# Patient Record
Sex: FEMALE | Employment: UNEMPLOYED | URBAN - METROPOLITAN AREA
[De-identification: names, ages, dates, MRNs, and addresses within clinical notes are randomized per-mention and may not be internally consistent; named-entity substitution may affect disease eponyms.]

---

## 2019-12-13 ENCOUNTER — OFFICE VISIT (OUTPATIENT)
Dept: HEMATOLOGY | Age: 44
End: 2019-12-13

## 2019-12-13 VITALS
WEIGHT: 234 LBS | BODY MASS INDEX: 38.99 KG/M2 | HEIGHT: 65 IN | HEART RATE: 76 BPM | OXYGEN SATURATION: 99 % | TEMPERATURE: 98.8 F | DIASTOLIC BLOOD PRESSURE: 71 MMHG | SYSTOLIC BLOOD PRESSURE: 117 MMHG

## 2019-12-13 DIAGNOSIS — R16.0 LIVER MASS: Primary | ICD-10-CM

## 2019-12-13 DIAGNOSIS — R16.0 LIVER MASS: ICD-10-CM

## 2019-12-13 PROBLEM — J45.909 ASTHMA: Status: ACTIVE | Noted: 2019-12-13

## 2019-12-13 NOTE — PROGRESS NOTES
[facility], Tia DEL RIO Vicie Amsler, MD Lee Carina, SARI Escalera, [facility]     [place], [facility]   JUSTIN Gardiner-LOPEZ Joe, [facility]       Faviola Fuller Lobo [address] 136    at [address], [address] [place] [address] 22.    [phone]    FAX: [address] [place] [address], [address]    [phone]    FAX: [phone]       Patient Care Team:  Jordy Alarcon MD as PCP - General (Family Practice)      Problem List  Date Reviewed: 12/13/2019          Codes Class Noted    Liver mass ICD-10-CM: R16.0  ICD-9-CM: 573.8  12/13/2019        Asthma ICD-10-CM: J45.909  ICD-9-CM: 493.90  12/13/2019                The clinicians listed above have asked me to see [facility] in consultation regarding a liver mass. All medical records sent by the referring physicians were reviewed including imaging studies     The patient is a 38 y.o. [facility] female without any history of previous liver disease. The patient underwent a CT scan for evaluation of  symptoms and frequent UTIs 11/2019. This demonstrated a single mass measuring 3.1 x 1.5 cm in the right lobe, segment 5. The patient has not taken oral CH. She has used a giselle in the past.  She now uses a copper IUD. The patient has the following symptoms which are thought to be due to the liver disease:  fatigue, pain in the right side over the liver,     The patient is not currently experiencing the following symptoms of liver disease:  nausea, vomiting,     The patient completes all daily activities without any functional limitations. ASSESSMENT AND PLAN:  Liver mass   This is most likely to be hemangioma.   It is described as being heterogenously enhancing but not filling in from the periphery  No delayed images were obtained or reported. The patient is asymptomatic     Laboratory studies and imaging suggest the patient has no evidence of liver disease. Have performed laboratory testing to monitor liver function and degree of liver injury. This included BMP, hepatic panel, CBC with platelet count, INR. Liver transaminases are normal.  ALP is normal.  Liver function is normal.  The platelet count is normal.      Will measure the following serologic cancer markers AFP, This was normal.    Serologic testing for causes of chronic liver disease were ordered. All were negative     Will perform imaging of the liver with dynamic MRI. Treatment of other medical problems in patients with chronic liver disease  There are no contraindications for the patient to take most medications that are necessary for treatment of other medical issues. Counseling for alcohol in patients with chronic liver disease  The patient was counseled regarding alcohol consumption and the effect of alcohol on chronic liver disease. The patient does not consume any significant amount of alcohol. Vaccinations   Vaccination for viral hepatitis B is recommended since the patient has no serologic evidence of previous exposure or vaccination with immunity. Vaccination for viral hepatitis A is not needed. The patient has serologic evidence of prior exposure or vaccination with immunity. Routine vaccinations against other bacterial and viral agents can be performed as indicated. Annual flu vaccination should be administered if indicated. ALLERGIES  No Known Allergies    MEDICATIONS  Current Outpatient Medications   Medication Sig    prenatal vit-iron fumarate-fa (PRENATAL S) 27-0.8 mg Tab tablet Take 1 Tab by mouth daily. No current facility-administered medications for this visit.         SYSTEM REVIEW NOT RELATED TO LIVER DISEASE OR REVIEWED ABOVE:  Constitution systems: Negative for fever, chills, weight gain, weight loss. Eyes: Negative for visual changes. ENT: Negative for sore throat, painful swallowing. Respiratory: Negative for cough, hemoptysis, SOB. Cardiology: Negative for chest pain, palpitations. GI:  Negative for constipation or diarrhea. : Negative for urinary frequency, dysuria, hematuria, nocturia. Skin: Negative for rash. Hematology: Negative for easy bruising, blood clots. Musculo-skelatal: Negative for back pain, muscle pain, weakness. Neurologic: Negative for headaches, dizziness, vertigo, memory problems not related to HE. Psychology: Negative for anxiety, depression. FAMILY HISTORY:  The father  of Aortic disection. The mother Has/had the following chronic disease(s): DM. There is no family history of liver disease. SOCIAL HISTORY:  The patient is . The patient has 3 children,   The patient has never used tobacco products. The patient has never consumed significant amounts of alcohol. The patient does not work outside the home. PHYSICAL EXAMINATION:  Visit Vitals  /71 (BP 1 Location: Left arm, BP Patient Position: Sitting)   Pulse 76   Temp 98.8 °F (37.1 °C) (Tympanic)   Ht 5' 5\" (1.651 m)   Wt 234 lb (106.1 kg)   SpO2 99%   BMI 38.94 kg/m²     General: No acute distress. Eyes: Sclera anicteric. ENT: No oral lesions. Thyroid normal.  Nodes: No adenopathy. Skin: No spider angiomata. No jaundice. No palmar erythema. Respiratory: Lungs clear to auscultation. Cardiovascular: Regular heart rate. No murmurs. No JVD. Abdomen: Soft non-tender. Liver size normal to percussion/palpation. Spleen not palpable. No obvious ascites. Extremities: No edema. No muscle wasting. No gross arthritic changes. Neurologic: Alert and oriented. Cranial nerves grossly intact. No asterixis.     LABORATORY STUDIES:  Liver [place] of [address] [place] [address] & Units 12/13/2019   WBC 3.4 - 10.8 x10E3/uL 7.9   ANC 1.4 - 7.0 x10E3/uL 4.2   HGB 11.1 - 15.9 g/dL 11.9    - 450 x10E3/uL 341   AST 0 - 40 IU/L 18   ALT 0 - 32 IU/L 20   Alk Phos 39 - 117 IU/L 84   Bili, Total 0.0 - 1.2 mg/dL 0.3   Bili, Direct 0.00 - 0.40 mg/dL 0.11   Albumin 3.5 - 5.5 g/dL 4.6   BUN 6 - 24 mg/dL 19   Creat 0.57 - 1.00 mg/dL 0.87   Na 134 - 144 mmol/L 136   K 3.5 - 5.2 mmol/L 4.5   Cl 96 - 106 mmol/L 99   CO2 20 - 29 mmol/L 20   Glucose 65 - 99 mg/dL 94     Cancer Screening Latest Ref Rng & Units 12/13/2019   AFP, Serum 0.0 - 8.0 ng/mL 1.6     SEROLOGIES:  Serologies Latest Ref Rng & Units 12/13/2019   Hep A Ab, Total Negative Positive (A)   Hep B Surface Ag Negative Negative   Hep B Core Ab, Total Negative Negative   Hep B Surface AB QL  Non Reactive   Hep C Ab 0.0 - 0.9 s/co ratio <0.1     LIVER HISTOLOGY:  Not available or performed    ENDOSCOPIC PROCEDURES:  Not available or performed    RADIOLOGY:  11/2019. CT scan abdomen with and without IV contrast.  Normal appearing liver. Liver mass with heterogenous measuring 3.1 x 1.5 cm right lobe, segment 5. OTHER TESTING:  Not available or performed    FOLLOW-UP:  All of the issues listed above in the Assessment and Plan were discussed with the patient. All questions were answered. The patient expressed a clear understanding of the above. [facility] 87 in 4 weeks for Fibroscan. This should be 1-2 weeks after the next imaging study.         MD [facility] of [address], [facility] 22.  [phone]  [address] [place] [address]

## 2019-12-13 NOTE — PROGRESS NOTES
Angelina Taylor is a 38 y.o. female  Chief Complaint   Patient presents with    New Patient     liver mass         Visit Vitals  /71 (BP 1 Location: Left arm, BP Patient Position: Sitting)   Pulse 76   Temp 98.8 °F (37.1 °C) (Tympanic)   Ht 5' 5\" (1.651 m)   Wt 234 lb (106.1 kg)   SpO2 99%   BMI 38.94 kg/m²     3 most recent PHQ Screens 12/13/2019   Little interest or pleasure in doing things Not at all   Feeling down, depressed, irritable, or hopeless Not at all   Total Score PHQ 2 0         Learning Assessment 12/13/2019   PRIMARY LEARNER Patient   HIGHEST LEVEL OF EDUCATION - PRIMARY LEARNER  > 4 YEARS OF COLLEGE   BARRIERS PRIMARY LEARNER NONE   CO-LEARNER CAREGIVER No   PRIMARY LANGUAGE ENGLISH   LEARNER PREFERENCE PRIMARY READING   ANSWERED BY patient   RELATIONSHIP SELF     Abuse Screening Questionnaire 12/13/2019   Do you ever feel afraid of your partner? N   Are you in a relationship with someone who physically or mentally threatens you? N   Is it safe for you to go home?  Artie Nuñez

## 2019-12-14 LAB
ALBUMIN SERPL-MCNC: 4.6 G/DL (ref 3.5–5.5)
ALP SERPL-CCNC: 84 IU/L (ref 39–117)
ALT SERPL-CCNC: 20 IU/L (ref 0–32)
AST SERPL-CCNC: 18 IU/L (ref 0–40)
BASOPHILS # BLD AUTO: 0.1 X10E3/UL (ref 0–0.2)
BASOPHILS NFR BLD AUTO: 1 %
BILIRUB DIRECT SERPL-MCNC: 0.11 MG/DL (ref 0–0.4)
BILIRUB SERPL-MCNC: 0.3 MG/DL (ref 0–1.2)
BUN SERPL-MCNC: 19 MG/DL (ref 6–24)
BUN/CREAT SERPL: 22 (ref 9–23)
CALCIUM SERPL-MCNC: 9.5 MG/DL (ref 8.7–10.2)
CHLORIDE SERPL-SCNC: 99 MMOL/L (ref 96–106)
CO2 SERPL-SCNC: 20 MMOL/L (ref 20–29)
COMMENT, 144067: NORMAL
CREAT SERPL-MCNC: 0.87 MG/DL (ref 0.57–1)
EOSINOPHIL # BLD AUTO: 0.1 X10E3/UL (ref 0–0.4)
EOSINOPHIL NFR BLD AUTO: 2 %
ERYTHROCYTE [DISTWIDTH] IN BLOOD BY AUTOMATED COUNT: 12.7 % (ref 12.3–15.4)
GLUCOSE SERPL-MCNC: 94 MG/DL (ref 65–99)
HAV AB SER QL IA: POSITIVE
HBV CORE AB SERPL QL IA: NEGATIVE
HBV SURFACE AB SER QL: NON REACTIVE
HBV SURFACE AG SERPL QL IA: NEGATIVE
HCT VFR BLD AUTO: 37.7 % (ref 34–46.6)
HCV AB S/CO SERPL IA: <0.1 S/CO RATIO (ref 0–0.9)
HGB BLD-MCNC: 11.9 G/DL (ref 11.1–15.9)
IMM GRANULOCYTES # BLD AUTO: 0 X10E3/UL (ref 0–0.1)
IMM GRANULOCYTES NFR BLD AUTO: 0 %
LYMPHOCYTES # BLD AUTO: 2.9 X10E3/UL (ref 0.7–3.1)
LYMPHOCYTES NFR BLD AUTO: 37 %
MCH RBC QN AUTO: 25.4 PG (ref 26.6–33)
MCHC RBC AUTO-ENTMCNC: 31.6 G/DL (ref 31.5–35.7)
MCV RBC AUTO: 81 FL (ref 79–97)
MONOCYTES # BLD AUTO: 0.7 X10E3/UL (ref 0.1–0.9)
MONOCYTES NFR BLD AUTO: 9 %
NEUTROPHILS # BLD AUTO: 4.2 X10E3/UL (ref 1.4–7)
NEUTROPHILS NFR BLD AUTO: 51 %
PLATELET # BLD AUTO: 341 X10E3/UL (ref 150–450)
POTASSIUM SERPL-SCNC: 4.5 MMOL/L (ref 3.5–5.2)
PROT SERPL-MCNC: 7.3 G/DL (ref 6–8.5)
RBC # BLD AUTO: 4.68 X10E6/UL (ref 3.77–5.28)
SODIUM SERPL-SCNC: 136 MMOL/L (ref 134–144)
WBC # BLD AUTO: 7.9 X10E3/UL (ref 3.4–10.8)

## 2019-12-16 LAB
AFP L3 MFR SERPL: NORMAL % (ref 0–9.9)
AFP SERPL-MCNC: 1.6 NG/ML (ref 0–8)

## 2019-12-26 ENCOUNTER — HOSPITAL ENCOUNTER (OUTPATIENT)
Dept: MRI IMAGING | Age: 44
Discharge: HOME | End: 2019-12-26
Attending: INTERNAL MEDICINE
Payer: COMMERCIAL

## 2019-12-26 VITALS — WEIGHT: 231 LBS | BODY MASS INDEX: 38.44 KG/M2

## 2019-12-26 DIAGNOSIS — R16.0 LIVER MASS: ICD-10-CM

## 2019-12-26 PROCEDURE — 74183 MRI ABD W/O CNTR FLWD CNTR: CPT

## 2019-12-26 PROCEDURE — A9585 GADOBUTROL INJECTION: HCPCS | Performed by: INTERNAL MEDICINE

## 2019-12-26 PROCEDURE — 74011250636 HC RX REV CODE- 250/636: Performed by: INTERNAL MEDICINE

## 2019-12-26 RX ADMIN — GADOBUTROL 10 ML: 604.72 INJECTION INTRAVENOUS at 09:29

## 2020-01-10 ENCOUNTER — OFFICE VISIT (OUTPATIENT)
Dept: HEMATOLOGY | Age: 45
End: 2020-01-10

## 2020-01-10 VITALS
OXYGEN SATURATION: 99 % | TEMPERATURE: 99.2 F | DIASTOLIC BLOOD PRESSURE: 74 MMHG | WEIGHT: 236 LBS | HEIGHT: 65 IN | BODY MASS INDEX: 39.32 KG/M2 | HEART RATE: 88 BPM | SYSTOLIC BLOOD PRESSURE: 108 MMHG

## 2020-01-10 DIAGNOSIS — D18.03 LIVER HEMANGIOMA: Primary | ICD-10-CM

## 2020-01-10 NOTE — PROGRESS NOTES
Yareli Reyes is a 38 y.o. female  Chief Complaint   Patient presents with    Follow-up         Visit Vitals  /74 (BP 1 Location: Left arm, BP Patient Position: Sitting)   Pulse 88   Temp 99.2 °F (37.3 °C) (Tympanic)   Ht 5' 5\" (1.651 m)   Wt 236 lb (107 kg)   SpO2 99%   BMI 39.27 kg/m²     3 most recent PHQ Screens 1/10/2020   Little interest or pleasure in doing things Not at all   Feeling down, depressed, irritable, or hopeless Not at all   Total Score PHQ 2 0     Learning Assessment 1/10/2020   PRIMARY LEARNER Patient   HIGHEST LEVEL OF EDUCATION - PRIMARY LEARNER  -   BARRIERS PRIMARY LEARNER NONE   CO-LEARNER CAREGIVER No   PRIMARY LANGUAGE ENGLISH   LEARNER PREFERENCE PRIMARY DEMONSTRATION   ANSWERED BY patient   RELATIONSHIP SELF     Abuse Screening Questionnaire 1/10/2020   Do you ever feel afraid of your partner? N   Are you in a relationship with someone who physically or mentally threatens you? N   Is it safe for you to go home? Y         1. Have you been to the ER, urgent care clinic since your last visit? Hospitalized since your last visit? No    2. Have you seen or consulted any other health care providers outside of the [address] [place] [address] since your last visit? Include any pap smears or colon screening.  No

## 2020-01-10 NOTE — PROGRESS NOTES
[facility], Rony DEL RIO, Mague Mckeon MD Lauretha Head, PARINA Chacon Officer, [facility]     [place], [facility]   Susy Yun, FNP-LOPEZ Siddiqi, [facility]       Faviola Fuller [facility] De Knight 136    at [address], [facility] Mark Durant  22.    [phone]    FAX: [address] [place] [address], [address]    [phone]    FAX: [phone]       Patient Care Team:  Juwan Fontaine MD as PCP - General (Family Practice)      Problem List  Date Reviewed: 12/21/2019          Codes Class Noted    Liver mass ICD-10-CM: R16.0  ICD-9-CM: 573.8  12/13/2019        Asthma ICD-10-CM: J45.909  ICD-9-CM: 493.90  12/13/2019              Frieda Gonzalez returns to the [address] for management of 2 liver masses. The active problem list, all pertinent past medical history, medications, radiologic findings and laboratory findings related to the liver disorder were reviewed with the patient. The patient is a 38 [facility]. [facility] female without any history of previous liver disease. The patient underwent a CT scan for evaluation of  symptoms and frequent UTIs 11/2019. This demonstrated a single mass measuring 3.1 x 1.5 cm in the right lobe, segment 5. The most recent imaging of the liver was MRI performed in 12/2019. Results suggest that the liver is normal.  Hemangioma in Right lobe 2.9 x 1.4 cm. Right adrenal adenoma 1.9 x 2.3 cm    The patient has not taken [place]. She has used a gsielle in the past.  She now uses a copper IUD.     The patient has the following symptoms which are thought to be due to the liver disease:  fatigue, pain in the right side over the liver,     The patient is not currently experiencing the following symptoms of liver disease:  nausea, vomiting,     The patient completes all daily activities without any functional limitations. ASSESSMENT AND PLAN:  Hemangioma  There is a liver mass which characteristics of hemangioma on MRI. These lesions are benign and only rarely increase in size over time. Hemangiomas rarely bleed  Large hemangiomas can cause RUQ fullness of intermittent pain. No further imaging of the hemangioma is needed. Liver transaminases are normal.  ALP is normal.  Liver function is normal.  The platelet count is normal.    AFP was normal.  Serologic testing for chronic was negative for HBV, HCV. Adrenal adenoma  An right sided adrenal adenoma was identified. This should be monitored by the PCP. Treatment of other medical problems in patients with chronic liver disease  There are no contraindications for the patient to take most medications that are necessary for treatment of other medical issues. Counseling for alcohol in patients with chronic liver disease  The patient was counseled regarding alcohol consumption and the effect of alcohol on chronic liver disease. The patient does not consume any significant amount of alcohol. Vaccinations   Vaccination for viral hepatitis B is recommended since the patient has no serologic evidence of previous exposure or vaccination with immunity. Vaccination for viral hepatitis A is not needed. The patient has serologic evidence of prior exposure or vaccination with immunity. Routine vaccinations against other bacterial and viral agents can be performed as indicated. Annual flu vaccination should be administered if indicated. ALLERGIES  No Known Allergies    MEDICATIONS  Current Outpatient Medications   Medication Sig    prenatal vit-iron fumarate-fa (PRENATAL S) 27-0.8 mg Tab tablet Take 1 Tab by mouth daily. No current facility-administered medications for this visit.         SYSTEM REVIEW NOT RELATED TO LIVER DISEASE OR REVIEWED ABOVE:  Constitution systems: Negative for fever, chills, weight gain, weight loss. Eyes: Negative for visual changes. ENT: Negative for sore throat, painful swallowing. Respiratory: Negative for cough, hemoptysis, SOB. Cardiology: Negative for chest pain, palpitations. GI:  Negative for constipation or diarrhea. : Negative for urinary frequency, dysuria, hematuria, nocturia. Skin: Negative for rash. Hematology: Negative for easy bruising, blood clots. Musculo-skelatal: Negative for back pain, muscle pain, weakness. Neurologic: Negative for headaches, dizziness, vertigo, memory problems not related to HE. Psychology: Negative for anxiety, depression. FAMILY HISTORY:  The father  of Aortic disection. The mother Has/had the following chronic disease(s): DM. There is no family history of liver disease. SOCIAL HISTORY:  The patient is . The patient has 3 children,   The patient has never used tobacco products. The patient has never consumed significant amounts of alcohol. The patient does not work outside the home. PHYSICAL EXAMINATION:  Visit Vitals  /74 (BP 1 Location: Left arm, BP Patient Position: Sitting)   Pulse 88   Temp 99.2 °F (37.3 °C) (Tympanic)   Ht 5' 5\" (1.651 m)   Wt 236 lb (107 kg)   SpO2 99%   BMI 39.27 kg/m²     General: No acute distress. Eyes: Sclera anicteric. ENT: No oral lesions. Thyroid normal.  Nodes: No adenopathy. Skin: No spider angiomata. No jaundice. No palmar erythema. Respiratory: Lungs clear to auscultation. Cardiovascular: Regular heart rate. No murmurs. No JVD. Abdomen: Soft non-tender. Liver size normal to percussion/palpation. Spleen not palpable. No obvious ascites. Extremities: No edema. No muscle wasting. No gross arthritic changes. Neurologic: Alert and oriented. Cranial nerves grossly intact. No asterixis.     LABORATORY STUDIES:  Liver [place] Latest Ref Rng & Units 12/13/2019   WBC 3.4 - 10.8 x10E3/uL 7.9   ANC 1.4 - 7.0 x10E3/uL 4.2   HGB 11.1 - 15.9 g/dL 11.9    - 450 x10E3/uL 341   AST 0 - 40 IU/L 18   ALT 0 - 32 IU/L 20   Alk Phos 39 - 117 IU/L 84   Bili, Total 0.0 - 1.2 mg/dL 0.3   Bili, Direct 0.00 - 0.40 mg/dL 0.11   Albumin 3.5 - 5.5 g/dL 4.6   BUN 6 - 24 mg/dL 19   Creat 0.57 - 1.00 mg/dL 0.87   Na 134 - 144 mmol/L 136   K 3.5 - 5.2 mmol/L 4.5   Cl 96 - 106 mmol/L 99   CO2 20 - 29 mmol/L 20   Glucose 65 - 99 mg/dL 94     Cancer Screening Latest Ref Rng & Units 12/13/2019   AFP, Serum 0.0 - 8.0 ng/mL 1.6     SEROLOGIES:  Serologies Latest Ref Rng & Units 12/13/2019   Hep A Ab, Total Negative Positive (A)   Hep B Surface Ag Negative Negative   Hep B Core Ab, Total Negative Negative   Hep B Surface AB QL  Non Reactive   Hep C Ab 0.0 - 0.9 s/co ratio <0.1     LIVER HISTOLOGY:  Not available or performed    ENDOSCOPIC PROCEDURES:  Not available or performed    RADIOLOGY:  11/2019. CT scan abdomen with and without IV contrast.  Normal appearing liver. Liver mass with heterogenous measuring 3.1 x 1.5 cm right lobe, segment 5.    12/2019. Dynamic MRI of liver. Normal appearing liver. Liver mass consistent with hemangioma in the   right lobe measuring 2.9 x 1.4 cm. Right sided adrenal adenoma measuring 1.9 x 2.3 cm    OTHER TESTING:  Not available or performed    FOLLOW-UP:  All of the issues listed above in the Assessment and Plan were discussed with the patient. All questions were answered. The patient expressed a clear understanding of the above. No follow-up at [facility] is needed. I would be glad to see the patient back for follow-up at any time in the future if the clinical situation changes.       Buster Morrison MD  [facility] of [address], [address] [place] [address], [facility] 22.  [phone]  [facility]

## 2020-01-10 NOTE — Clinical Note
1/26/20 Patient: Adi Rodriguez YOB: 1975 Date of Visit: 1/10/2020 Catia Magdaleno MD 
[address] [address] VIA Facsimile: [phone] Dear Catia Magdaleno MD, Thank you for referring Ms. Adi Rodriguez [facility]. My notes for this consultation are attached. If you have questions, please do not hesitate to call me. I look forward to following your patient along with you. Sincerely, Jhon Devi MD

## 2022-01-19 ENCOUNTER — HOSPITAL ENCOUNTER (EMERGENCY)
Age: 47
Discharge: HOME | End: 2022-01-19
Attending: STUDENT IN AN ORGANIZED HEALTH CARE EDUCATION/TRAINING PROGRAM
Payer: COMMERCIAL

## 2022-01-19 ENCOUNTER — APPOINTMENT (OUTPATIENT)
Dept: CT IMAGING | Age: 47
End: 2022-01-19
Attending: STUDENT IN AN ORGANIZED HEALTH CARE EDUCATION/TRAINING PROGRAM
Payer: COMMERCIAL

## 2022-01-19 VITALS
BODY MASS INDEX: 40.98 KG/M2 | DIASTOLIC BLOOD PRESSURE: 80 MMHG | RESPIRATION RATE: 16 BRPM | SYSTOLIC BLOOD PRESSURE: 112 MMHG | HEIGHT: 65 IN | OXYGEN SATURATION: 99 % | WEIGHT: 246 LBS | HEART RATE: 92 BPM | TEMPERATURE: 97.2 F

## 2022-01-19 DIAGNOSIS — R10.9 ACUTE RIGHT FLANK PAIN: Primary | ICD-10-CM

## 2022-01-19 DIAGNOSIS — K76.0 HEPATIC STEATOSIS: ICD-10-CM

## 2022-01-19 PROCEDURE — 74176 CT ABD & PELVIS W/O CONTRAST: CPT

## 2022-01-19 PROCEDURE — 99282 EMERGENCY DEPT VISIT SF MDM: CPT

## 2022-01-20 NOTE — ED TRIAGE NOTES
Patient arrives reporting nausea, bilateral flank pain, and fever that began yesterday. Per pt she is covid vaccinated including booster. Pt [facility], reports \"chronic uti for two years\".  Negative at home rapid covid test yesterday, pending pcr from [facility] today

## 2022-01-20 NOTE — ED NOTES
PT verbalizes readiness for discharge. Discussed with pt follow up with primary care for further evaluation of symptoms and to seek medical attention as needed.

## 2022-01-20 NOTE — ED PROVIDER NOTES
Flank Pain   This is a new problem. The current episode started 2 days ago. The problem has not changed since onset. The problem occurs daily. Patient reports not work related injury. The pain is associated with no known injury. The pain is present in the lower back and right side. The quality of the pain is described as aching. The pain does not radiate. The pain is mild. The symptoms are aggravated by certain positions. The pain is the same all the time. Associated symptoms include a fever (100.3 F yesterday). Pertinent negatives include no headaches, no abdominal pain and no dysuria. Associated symptoms comments: Fast HR yesterday  . Treatments tried: finished Macrobid yesterday. Risk factors: chronic UTIs, follows with uro-gyn.  appendectomy       Past Medical History:   Diagnosis Date    Low blood pressure        Past Surgical History:   Procedure Laterality Date    HX APPENDECTOMY      HX OTHER SURGICAL      ovarian cystectomy    HX OTHER SURGICAL      appendectomy         Family History:   Problem Relation Age of Onset    Hypertension Father        Social History     Socioeconomic History    Marital status:      Spouse name: Not on file    Number of children: Not on file    Years of education: Not on file    Highest education level: Not on file   Occupational History    Not on file   Tobacco Use    Smoking status: Never Smoker    Smokeless tobacco: Never Used   Substance and Sexual Activity    Alcohol use: No    Drug use: No    Sexual activity: Yes     Partners: Male     Birth control/protection: None   Other Topics Concern    Not on file   Social History Narrative    Not on file     Social Determinants of Health     Financial Resource Strain:     Difficulty of Paying Living Expenses: Not on file   Food Insecurity:     Worried About Running Out of Food in the Last Year: Not on file    Richelle of Food in the Last Year: Not on file   Transportation Needs:     Lack of Transportation (Medical): Not on file    Lack of Transportation (Non-Medical): Not on file   Physical Activity:     Days of Exercise per Week: Not on file    Minutes of Exercise per Session: Not on file   Stress:     Feeling of Stress : Not on file   Social Connections:     Frequency of Communication with Friends and Family: Not on file    Frequency of Social Gatherings with Friends and Family: Not on file    Attends Catholic Services: Not on file    Active Member of [address] [place] [address] [employer] or Organizations: Not on file    Attends Club or Organization Meetings: Not on file    Marital Status: Not on file   Intimate Partner Violence:     Fear of Current or Ex-Partner: Not on file    Emotionally Abused: Not on file    Physically Abused: Not on file    Sexually Abused: Not on file   Housing Stability:     Unable to Pay for Housing in the Last Year: Not on file    Number of [place] in the Last Year: Not on file    Unstable Housing in the Last Year: Not on file         ALLERGIES: Patient has no known allergies. Review of Systems   Constitutional: Positive for fever (100.3 F yesterday). Gastrointestinal: Negative for abdominal pain. Genitourinary: Positive for flank pain and hematuria. Negative for dysuria. Neurological: Negative for headaches. All other systems reviewed and are negative. Vitals:    01/19/22 2042   BP: 112/80   Pulse: 92   Resp: 16   Temp: 97.2 °F (36.2 °C)   SpO2: 99%   Weight: 111.6 kg (246 lb)   Height: 5' 5\" (1.651 m)            Physical Exam  Vitals and nursing note reviewed. Constitutional:       General: She is not in acute distress. Appearance: She is well-developed. She is obese. HENT:      Head: Normocephalic and atraumatic. Eyes:      Conjunctiva/sclera: Conjunctivae normal.   Cardiovascular:      Rate and Rhythm: Normal rate and regular rhythm. Heart sounds: Normal heart sounds. Pulmonary:      Effort: Pulmonary effort is normal. No respiratory distress.    Abdominal: General: Abdomen is flat. There is no distension. Musculoskeletal:      Cervical back: Normal range of motion and neck supple. Skin:     General: Skin is warm and dry. Neurological:      Mental Status: She is alert and oriented to person, place, and time. Motor: No abnormal muscle tone. [facility]     Assessment and plan: This is a 67-year-old female with chronic UTI who [facility] due to right flank pain and concern for possible kidney stone as she had some microscopic hematuria on her urinalysis. The [facility] at [facility] earlier today was unremarkable, with normal white count and unremarkable chemistry. UA did not look consistent with infection but did have 3-5 RBCs. Given her new pain in the right flank area, will obtain a CT to rule out kidney stone. She has follow up with UroGYN tomorrow.

## 2022-03-19 PROBLEM — R16.0 LIVER MASS: Status: ACTIVE | Noted: 2019-12-13

## 2022-03-20 PROBLEM — J45.909 ASTHMA: Status: ACTIVE | Noted: 2019-12-13

## 2023-02-27 ENCOUNTER — HOSPITAL ENCOUNTER (EMERGENCY)
Age: 48
Discharge: HOME | End: 2023-02-27
Attending: EMERGENCY MEDICINE
Payer: COMMERCIAL

## 2023-02-27 VITALS
TEMPERATURE: 98.6 F | HEART RATE: 86 BPM | WEIGHT: 240 LBS | OXYGEN SATURATION: 96 % | HEIGHT: 65 IN | BODY MASS INDEX: 39.99 KG/M2 | SYSTOLIC BLOOD PRESSURE: 106 MMHG | DIASTOLIC BLOOD PRESSURE: 69 MMHG | RESPIRATION RATE: 20 BRPM

## 2023-02-27 DIAGNOSIS — R65.10 SEVERE SYSTEMIC INFLAMMATORY RESPONSE SYNDROME (SIRS) (HCC): ICD-10-CM

## 2023-02-27 DIAGNOSIS — N30.90 CYSTITIS: Primary | ICD-10-CM

## 2023-02-27 LAB
ANION GAP SERPL CALC-SCNC: 12 MMOL/L (ref 5–15)
APPEARANCE UR: ABNORMAL
BACTERIA URNS QL MICRO: ABNORMAL /HPF
BASOPHILS # BLD: 0 K/UL (ref 0–0.1)
BASOPHILS NFR BLD: 0 % (ref 0–1)
BILIRUB UR QL: NEGATIVE
BUN SERPL-MCNC: 19 MG/DL (ref 6–20)
BUN/CREAT SERPL: 17 (ref 12–20)
CALCIUM SERPL-MCNC: 9.8 MG/DL (ref 8.5–10.1)
CHLORIDE SERPL-SCNC: 99 MMOL/L (ref 97–108)
CO2 SERPL-SCNC: 25 MMOL/L (ref 21–32)
COLOR UR: ABNORMAL
CREAT SERPL-MCNC: 1.1 MG/DL (ref 0.55–1.02)
DIFFERENTIAL METHOD BLD: ABNORMAL
EOSINOPHIL # BLD: 0.2 K/UL (ref 0–0.4)
EOSINOPHIL NFR BLD: 2 % (ref 0–7)
EPITH CASTS URNS QL MICRO: ABNORMAL /LPF
ERYTHROCYTE [DISTWIDTH] IN BLOOD BY AUTOMATED COUNT: 13.1 % (ref 11.5–14.5)
GLUCOSE SERPL-MCNC: 109 MG/DL (ref 65–100)
GLUCOSE UR STRIP.AUTO-MCNC: 250 MG/DL
HCT VFR BLD AUTO: 39.6 % (ref 35–47)
HGB BLD-MCNC: 12.5 G/DL (ref 11.5–16)
HGB UR QL STRIP: ABNORMAL
IMM GRANULOCYTES # BLD AUTO: 0 K/UL (ref 0–0.04)
IMM GRANULOCYTES NFR BLD AUTO: 0 % (ref 0–0.5)
KETONES UR QL STRIP.AUTO: 15 MG/DL
LACTATE SERPL-SCNC: 2 MMOL/L (ref 0.4–2)
LEUKOCYTE ESTERASE UR QL STRIP.AUTO: ABNORMAL
LYMPHOCYTES # BLD: 0.7 K/UL (ref 0.8–3.5)
LYMPHOCYTES NFR BLD: 8 % (ref 12–49)
MCH RBC QN AUTO: 25.1 PG (ref 26–34)
MCHC RBC AUTO-ENTMCNC: 31.6 G/DL (ref 30–36.5)
MCV RBC AUTO: 79.4 FL (ref 80–99)
MONOCYTES # BLD: 0.4 K/UL (ref 0–1)
MONOCYTES NFR BLD: 5 % (ref 5–13)
MUCOUS THREADS URNS QL MICRO: ABNORMAL /LPF
NEUTS SEG # BLD: 7.1 K/UL (ref 1.8–8)
NEUTS SEG NFR BLD: 85 % (ref 32–75)
NITRITE UR QL STRIP.AUTO: POSITIVE
NRBC # BLD: 0 K/UL (ref 0–0.01)
NRBC BLD-RTO: 0 PER 100 WBC
PH UR STRIP: 6.5 (ref 5–8)
PLATELET # BLD AUTO: 314 K/UL (ref 150–400)
PMV BLD AUTO: 11.2 FL (ref 8.9–12.9)
POTASSIUM SERPL-SCNC: 3.9 MMOL/L (ref 3.5–5.1)
PROT UR STRIP-MCNC: >300 MG/DL
RBC # BLD AUTO: 4.99 M/UL (ref 3.8–5.2)
RBC #/AREA URNS HPF: ABNORMAL /HPF (ref 0–5)
RBC MORPH BLD: ABNORMAL
SODIUM SERPL-SCNC: 136 MMOL/L (ref 136–145)
SP GR UR REFRACTOMETRY: 1.01 (ref 1–1.03)
UROBILINOGEN UR QL STRIP.AUTO: >8 EU/DL (ref 0.2–1)
WBC # BLD AUTO: 8.4 K/UL (ref 3.6–11)
WBC URNS QL MICRO: ABNORMAL /HPF (ref 0–4)

## 2023-02-27 PROCEDURE — 36415 COLL VENOUS BLD VENIPUNCTURE: CPT

## 2023-02-27 PROCEDURE — 96374 THER/PROPH/DIAG INJ IV PUSH: CPT

## 2023-02-27 PROCEDURE — 74011000250 HC RX REV CODE- 250: Performed by: EMERGENCY MEDICINE

## 2023-02-27 PROCEDURE — 87086 URINE CULTURE/COLONY COUNT: CPT

## 2023-02-27 PROCEDURE — 99284 EMERGENCY DEPT VISIT MOD MDM: CPT

## 2023-02-27 PROCEDURE — 87040 BLOOD CULTURE FOR BACTERIA: CPT

## 2023-02-27 PROCEDURE — 85025 COMPLETE CBC W/AUTO DIFF WBC: CPT

## 2023-02-27 PROCEDURE — 81001 URINALYSIS AUTO W/SCOPE: CPT

## 2023-02-27 PROCEDURE — 96361 HYDRATE IV INFUSION ADD-ON: CPT

## 2023-02-27 PROCEDURE — 83605 ASSAY OF LACTIC ACID: CPT

## 2023-02-27 PROCEDURE — 74011250636 HC RX REV CODE- 250/636: Performed by: EMERGENCY MEDICINE

## 2023-02-27 PROCEDURE — 80048 BASIC METABOLIC PNL TOTAL CA: CPT

## 2023-02-27 PROCEDURE — 96375 TX/PRO/DX INJ NEW DRUG ADDON: CPT

## 2023-02-27 RX ORDER — METFORMIN HYDROCHLORIDE 500 MG/1
500 TABLET ORAL 2 TIMES DAILY WITH MEALS
COMMUNITY

## 2023-02-27 RX ORDER — NITROFURANTOIN (MACROCRYSTALS) 100 MG/1
CAPSULE ORAL
COMMUNITY
Start: 2023-02-26

## 2023-02-27 RX ORDER — ONDANSETRON 2 MG/ML
INJECTION INTRAMUSCULAR; INTRAVENOUS
Status: DISCONTINUED
Start: 2023-02-27 | End: 2023-02-28 | Stop reason: HOSPADM

## 2023-02-27 RX ORDER — SODIUM CHLORIDE 9 MG/ML
1000 INJECTION, SOLUTION INTRAVENOUS ONCE
Status: COMPLETED | OUTPATIENT
Start: 2023-02-27 | End: 2023-02-27

## 2023-02-27 RX ORDER — LEVOFLOXACIN 750 MG/1
750 TABLET ORAL DAILY
Qty: 10 TABLET | Refills: 0 | Status: SHIPPED | OUTPATIENT
Start: 2023-02-27 | End: 2023-03-09

## 2023-02-27 RX ORDER — ONDANSETRON 2 MG/ML
4 INJECTION INTRAMUSCULAR; INTRAVENOUS
Status: COMPLETED | OUTPATIENT
Start: 2023-02-27 | End: 2023-02-27

## 2023-02-27 RX ORDER — PHENAZOPYRIDINE HYDROCHLORIDE 200 MG/1
200 TABLET, FILM COATED ORAL 3 TIMES DAILY
Qty: 6 TABLET | Refills: 0 | Status: SHIPPED | OUTPATIENT
Start: 2023-02-27 | End: 2023-03-01

## 2023-02-27 RX ORDER — ONDANSETRON 4 MG/1
4 TABLET, FILM COATED ORAL
Qty: 20 TABLET | Refills: 0 | Status: SHIPPED | OUTPATIENT
Start: 2023-02-27

## 2023-02-27 RX ORDER — FLUCONAZOLE 100 MG/1
TABLET ORAL
COMMUNITY
Start: 2023-02-26

## 2023-02-27 RX ADMIN — SODIUM CHLORIDE 2 G: 9 INJECTION INTRAMUSCULAR; INTRAVENOUS; SUBCUTANEOUS at 21:21

## 2023-02-27 RX ADMIN — ONDANSETRON HYDROCHLORIDE 4 MG: 2 SOLUTION INTRAMUSCULAR; INTRAVENOUS at 21:28

## 2023-02-27 RX ADMIN — SODIUM CHLORIDE 1000 ML: 9 INJECTION, SOLUTION INTRAVENOUS at 21:21

## 2023-02-27 NOTE — Clinical Note
[facility]  [address]  [address]  [phone]    Work/School Note    Date: 2/27/2023    To Whom It May concern:    Neil Marino was seen and treated today in the emergency room by the following provider(s):  Attending Provider: Oliver Whiteside MD.      Neil Marino is excused from work/school on 02/27/23 and 02/28/23. She is medically clear to return to work/school on 3/1/2023.        Sincerely,          Isma Deshpande MD

## 2023-02-28 NOTE — ED PROVIDER NOTES
History of Present Illness:  Pt c/o UTI x 1 day, on meds, but today has a fever. Pt has hx of complicated UTIs. Also has nausea now    Past Medical History:   Diagnosis Date    Low blood pressure     UTI (urinary tract infection)        Past Surgical History:   Procedure Laterality Date    HX APPENDECTOMY      HX OTHER SURGICAL      ovarian cystectomy    HX OTHER SURGICAL      appendectomy         Family History:   Problem Relation Age of Onset    Hypertension Father        Social History     Socioeconomic History    Marital status:      Spouse name: Not on file    Number of children: Not on file    Years of education: Not on file    Highest education level: Not on file   Occupational History    Not on file   Tobacco Use    Smoking status: Never    Smokeless tobacco: Never   Substance and Sexual Activity    Alcohol use: No    Drug use: No    Sexual activity: Yes     Partners: Male     Birth control/protection: None   Other Topics Concern    Not on file   Social History Narrative    Not on file     Social Determinants of Health     Financial Resource Strain: Not on file   Food Insecurity: Not on file   Transportation Needs: Not on file   Physical Activity: Not on file   Stress: Not on file   Social Connections: Not on file   Intimate Partner Violence: Not on file   Housing Stability: Not on file         ALLERGIES: Patient has no known allergies. Review of Systems   Constitutional:  Positive for fever. Gastrointestinal:  Positive for abdominal pain and nausea. Genitourinary:  Positive for dysuria, frequency and urgency. Negative for difficulty urinating, flank pain and hematuria. All other systems reviewed and are negative. Vitals:    02/27/23 2145 02/27/23 2200 02/27/23 2215 02/27/23 2230   BP:  109/63  106/69   Pulse:       Resp:       Temp:       SpO2: 100% 99% 97% 96%   Weight:       Height:                Physical Exam  Vitals and nursing note reviewed.    Constitutional:       General: She is not in acute distress. Appearance: She is normal weight. She is not ill-appearing, toxic-appearing or diaphoretic. HENT:      Head: Normocephalic and atraumatic. Right Ear: External ear normal.      Left Ear: External ear normal.      Nose: Nose normal. No congestion or rhinorrhea. Mouth/Throat:      Mouth: Mucous membranes are moist.      Pharynx: Oropharynx is clear. No oropharyngeal exudate or posterior oropharyngeal erythema. Eyes:      General: No scleral icterus. Extraocular Movements: Extraocular movements intact. Conjunctiva/sclera: Conjunctivae normal.   Cardiovascular:      Rate and Rhythm: Regular rhythm. Tachycardia present. Pulses: Normal pulses. Heart sounds: Normal heart sounds. No murmur heard. No gallop. Pulmonary:      Effort: Pulmonary effort is normal. No respiratory distress. Breath sounds: Normal breath sounds. Abdominal:      General: Abdomen is flat. Bowel sounds are normal. There is no distension. Palpations: Abdomen is soft. Tenderness: There is abdominal tenderness in the suprapubic area. There is no right CVA tenderness, left CVA tenderness, guarding or rebound. Musculoskeletal:         General: No swelling, tenderness or deformity. Normal range of motion. Cervical back: Normal range of motion and neck supple. No rigidity or tenderness. Right lower leg: No edema. Left lower leg: No edema. Lymphadenopathy:      Cervical: No cervical adenopathy. Skin:     General: Skin is warm. Capillary Refill: Capillary refill takes less than 2 seconds. Coloration: Skin is not jaundiced. Findings: No bruising or erythema. Neurological:      General: No focal deficit present. Mental Status: She is alert and oriented to person, place, and time. Cranial Nerves: No cranial nerve deficit. Motor: No weakness. Psychiatric:         Mood and Affect: Mood normal.         Thought Content:  Thought content normal.        Recent Results (from the past 72 hour(s))   URINALYSIS W/MICROSCOPIC    Collection Time: 02/27/23  8:23 PM   Result Value Ref Range    Color RED      Appearance HAZY (A) CLEAR      Specific gravity 1.015 1.003 - 1.030      pH (UA) 6.5 5.0 - 8.0      Protein >300 (A) NEG mg/dL    Glucose 250 (A) NEG mg/dL    Ketone 15 (A) NEG mg/dL    Bilirubin Negative NEG      Blood TRACE (A) NEG      Urobilinogen >8.0 (H) 0.2 - 1.0 EU/dL    Nitrites Positive (A) NEG      Leukocyte Esterase MODERATE (A) NEG      WBC 20-50 0 - 4 /hpf    RBC 5-10 0 - 5 /hpf    Epithelial cells FEW FEW /lpf    Bacteria 1+ (A) NEG /hpf    Mucus TRACE (A) NEG /lpf   LACTIC ACID    Collection Time: 02/27/23  9:35 PM   Result Value Ref Range    Lactic acid 2.0 0.4 - 2.0 MMOL/L   CBC WITH AUTOMATED DIFF    Collection Time: 02/27/23  9:35 PM   Result Value Ref Range    WBC 8.4 3.6 - 11.0 K/uL    RBC 4.99 3.80 - 5.20 M/uL    HGB 12.5 11.5 - 16.0 g/dL    HCT 39.6 35.0 - 47.0 %    MCV 79.4 (L) 80.0 - 99.0 FL    MCH 25.1 (L) 26.0 - 34.0 PG    MCHC 31.6 30.0 - 36.5 g/dL    RDW 13.1 11.5 - 14.5 %    PLATELET 983 930 - 005 K/uL    MPV 11.2 8.9 - 12.9 FL    NRBC 0.0 0  WBC    ABSOLUTE NRBC 0.00 0.00 - 0.01 K/uL    NEUTROPHILS 85 (H) 32 - 75 %    LYMPHOCYTES 8 (L) 12 - 49 %    MONOCYTES 5 5 - 13 %    EOSINOPHILS 2 0 - 7 %    BASOPHILS 0 0 - 1 %    IMMATURE GRANULOCYTES 0 0.0 - 0.5 %    ABS. NEUTROPHILS 7.1 1.8 - 8.0 K/UL    ABS. LYMPHOCYTES 0.7 (L) 0.8 - 3.5 K/UL    ABS. MONOCYTES 0.4 0.0 - 1.0 K/UL    ABS. EOSINOPHILS 0.2 0.0 - 0.4 K/UL    ABS. BASOPHILS 0.0 0.0 - 0.1 K/UL    ABS. IMM.  GRANS. 0.0 0.00 - 0.04 K/UL    DF SMEAR SCANNED      RBC COMMENTS MICROCYTOSIS  1+       METABOLIC PANEL, BASIC    Collection Time: 02/27/23  9:35 PM   Result Value Ref Range    Sodium 136 136 - 145 mmol/L    Potassium 3.9 3.5 - 5.1 mmol/L    Chloride 99 97 - 108 mmol/L    CO2 25 21 - 32 mmol/L    Anion gap 12 5 - 15 mmol/L    Glucose 109 (H) 65 - 100 mg/dL BUN 19 6 - 20 MG/DL    Creatinine 1.10 (H) 0.55 - 1.02 MG/DL    BUN/Creatinine ratio 17 12 - 20      eGFR >60 >60 ml/min/1.73m2    Calcium 9.8 8.5 - 10.1 MG/DL       No orders to display       Medical Decision Making  I considered sepsis, UTI, pyelonephritis, hemorrhagic cystitis, dehydration and other causes        Amount and/or Complexity of Data Reviewed  Independent Historian: spouse  External Data Reviewed: notes. Details: Prior visits for flank pain, COVID testing, liver mass, calf pain and breast lump  Labs: ordered. Details: Urinalysis showed clear evidence of urinary tract infection which appears to be quite severe. She also has mildly elevated creatinine but BUN was normal and the rest of her basic metabolic panel was normal.  Complete blood count demonstrated normal white blood cell count but left shift is present. Her lactic acid was 2.0. Discussion of management or test interpretation with external provider(s): Patient has improved here with IV fluids, Zofran and a first dose of IV antibiotics    Risk  OTC drugs. Prescription drug management. Decision regarding hospitalization. Risk Details: The patient ultiumately did not warrant hospitalization nor any acute surgical intervention, I considered the need for both. Also considered the need to obtain additional imaging and/or labs beyond what may have been ordered but no additional testing was indicated based on the patient's condition and results of any other testing that may have been performed. Any medications given here and/or prescribed for discharge are documented within the other portions of the note. After any medications or treatments that may have been provided here the patient was improved and symptoms had resolved or become tolerable or no medications or treatments were indicated aced on the patient's condition.   The patient was deemed stable safe and appropriate for discharge to home and is instructed to follow-up with his primary care doctor and return for any new or worsening symptoms. Procedures    Medications   0.9% sodium chloride infusion 1,000 mL (0 mL IntraVENous IV Completed 2/27/23 2211)   cefTRIAXone (ROCEPHIN) 2 g in 0.9% sodium chloride 20 mL IV syringe (2 g IntraVENous Given 2/27/23 2121)   ondansetron (ZOFRAN) injection 4 mg (4 mg IntraVENous Given 2/27/23 2128)          My Medications        START taking these medications        Instructions Each Dose to Equal Morning Noon Evening Bedtime   levoFLOXacin 750 mg tablet  Commonly known as: Levaquin    Your last dose was: Your next dose is: Take 1 Tablet by mouth daily for 10 days. 750 mg                 ondansetron hcl 4 mg tablet  Commonly known as: Zofran    Your last dose was: Your next dose is: Take 1 Tablet by mouth every eight (8) hours as needed for Nausea or Vomiting. 4 mg                 phenazopyridine 200 mg tablet  Commonly known as: Pyridium    Your last dose was: Your next dose is: Take 1 Tablet by mouth three (3) times daily for 2 days. 200 mg                        ASK your doctor about these medications        Instructions Each Dose to Equal Morning Noon Evening Bedtime   fluconazole 100 mg tablet  Commonly known as: DIFLUCAN    Your last dose was: Your next dose is:                         metFORMIN 500 mg tablet  Commonly known as: GLUCOPHAGE    Your last dose was: Your next dose is: Take 500 mg by mouth two (2) times daily (with meals). 500 mg                 nitrofurantoin 100 mg capsule  Commonly known as: MACRODANTIN    Your last dose was: Your next dose is:                         Prenatal S 27 mg iron- 0.8 mg Tab tablet  Generic drug: prenatal vit-iron fumarate-fa    Your last dose was: Your next dose is: Take 1 Tab by mouth daily.    1 Tablet                           Where to Get Your Medications        These medications were sent to [address] [place] [address] [place], [address]  [address], [address] [facility] [address]      Hours: 24-hours Phone: [phone]   levoFLOXacin 750 mg tablet  ondansetron hcl 4 mg tablet  phenazopyridine 200 mg tablet         Encounter Diagnoses     ICD-10-CM ICD-9-CM   1. Cystitis  N30.90 595.9   2.  Severe systemic inflammatory response syndrome (SIRS) (HCC)  R65.10 995.90       Follow-up Information       Follow up With Specialties Details Why Contact Info    [facility] Emergency [facility]  As needed, If symptoms worsen [address] [id number]            DISPOSITION:  Discharged

## 2023-02-28 NOTE — DISCHARGE INSTRUCTIONS
Please stay well-hydrated, take antibiotics as prescribed. Take Pyridium as needed for pain with urination. Take Motrin and Tylenol as needed for fever or pain. Follow-up with your primary care doctor and urologist as well as your infectious disease doctor as needed. Return for new or worsening symptoms.

## 2023-02-28 NOTE — ED NOTES
Pain assessment on discharge was   Condition Stable  Patient discharged to home  Patient education was completed  Education taught to patient   Teaching method used was handout and verbal  Understanding of teaching was good  Patient was discharged ambulatory  Discharged with family  Valuables were given to: patient remained in possession of belongings during stay.

## 2023-03-01 LAB
BACTERIA SPEC CULT: NORMAL
SERVICE CMNT-IMP: NORMAL

## 2023-03-04 LAB
BACTERIA SPEC CULT: NORMAL
SERVICE CMNT-IMP: NORMAL

## 2023-08-14 NOTE — Clinical Note
-- DO NOT REPLY / DO NOT REPLY ALL --  -- Message is from [employer] ([facility]) --    General Patient Message: Patient son calling back again as he has not heard back from the office regarding an ER follow up for his mom.  Please follow up with patient son at your earliest convenience.    Caller Information       Type Contact Phone/Fax    08/11/2023 09:05 AM CDT Phone (Incoming) ROBERT LEONARDO (Emergency Contact) [phone]    08/14/2023 08:10 AM CDT Phone (Incoming) ROBERT LEONARDO (Emergency Contact) [phone]        Alternative phone number: no    Can a detailed message be left? Yes    Message Turnaround:     Is it Working Hours? Yes - Working Hours     [place]:    Please give this turnaround time to the caller:   \"This message will be sent to [state Provider's name]. The clinical team will fulfill your request as soon as they review your message.\"                 12/21/19 Patient: Kate Vegas YOB: 1975 Date of Visit: 12/13/2019 Lucas Cerna MD 
[address] [address] VIA Facsimile: [phone] Dear Lucas Cerna MD, Thank you for referring Ms. Kate Vegas to [address] for evaluation. My notes for this consultation are attached. If you have questions, please do not hesitate to call me. I look forward to following your patient along with you. Sincerely, Joi Cagle MD

## 2024-03-17 ENCOUNTER — APPOINTMENT (OUTPATIENT)
Facility: HOSPITAL | Age: 49
End: 2024-03-17
Payer: COMMERCIAL

## 2024-03-17 ENCOUNTER — HOSPITAL ENCOUNTER (EMERGENCY)
Facility: HOSPITAL | Age: 49
Discharge: HOME | End: 2024-03-17
Attending: EMERGENCY MEDICINE
Payer: COMMERCIAL

## 2024-03-17 VITALS
SYSTOLIC BLOOD PRESSURE: 115 MMHG | WEIGHT: 268.74 LBS | HEIGHT: 65 IN | RESPIRATION RATE: 18 BRPM | TEMPERATURE: 98.1 F | DIASTOLIC BLOOD PRESSURE: 65 MMHG | HEART RATE: 68 BPM | BODY MASS INDEX: 44.78 KG/M2 | OXYGEN SATURATION: 98 %

## 2024-03-17 DIAGNOSIS — S91.331A PUNCTURE WOUND OF RIGHT FOOT, INITIAL ENCOUNTER: Primary | ICD-10-CM

## 2024-03-17 PROCEDURE — 73630 X-RAY EXAM OF FOOT: CPT

## 2024-03-17 PROCEDURE — 6360000002 HC RX W HCPCS: Performed by: PHYSICIAN ASSISTANT

## 2024-03-17 PROCEDURE — 90714 TD VACC NO PRESV 7 YRS+ IM: CPT | Performed by: PHYSICIAN ASSISTANT

## 2024-03-17 PROCEDURE — 6370000000 HC RX 637 (ALT 250 FOR IP): Performed by: PHYSICIAN ASSISTANT

## 2024-03-17 PROCEDURE — 90471 IMMUNIZATION ADMIN: CPT | Performed by: PHYSICIAN ASSISTANT

## 2024-03-17 PROCEDURE — 99284 EMERGENCY DEPT VISIT MOD MDM: CPT

## 2024-03-17 RX ORDER — FLUCONAZOLE 150 MG/1
150 TABLET ORAL ONCE
Qty: 1 TABLET | Refills: 0 | Status: SHIPPED | OUTPATIENT
Start: 2024-03-17 | End: 2024-03-17

## 2024-03-17 RX ORDER — TETANUS AND DIPHTHERIA TOXOIDS ADSORBED 2; 2 [LF]/.5ML; [LF]/.5ML
0.5 INJECTION INTRAMUSCULAR ONCE
Status: COMPLETED | OUTPATIENT
Start: 2024-03-17 | End: 2024-03-17

## 2024-03-17 RX ORDER — CEPHALEXIN 500 MG/1
500 CAPSULE ORAL 3 TIMES DAILY
Qty: 21 CAPSULE | Refills: 0 | Status: SHIPPED | OUTPATIENT
Start: 2024-03-17 | End: 2024-03-24

## 2024-03-17 RX ORDER — CEPHALEXIN 250 MG/1
500 CAPSULE ORAL ONCE
Status: COMPLETED | OUTPATIENT
Start: 2024-03-17 | End: 2024-03-17

## 2024-03-17 RX ADMIN — TETANUS AND DIPHTHERIA TOXOIDS ADSORBED 0.5 ML: 2; 2 INJECTION INTRAMUSCULAR at 19:52

## 2024-03-17 RX ADMIN — CEPHALEXIN 500 MG: 250 CAPSULE ORAL at 20:36

## 2024-03-17 ASSESSMENT — PAIN - FUNCTIONAL ASSESSMENT: PAIN_FUNCTIONAL_ASSESSMENT: 0-10

## 2024-03-17 ASSESSMENT — PAIN SCALES - GENERAL: PAINLEVEL_OUTOF10: 4

## 2024-03-17 NOTE — ED TRIAGE NOTES
Pt stepped on a rake, two puncture wounds to the bottom of left foot. Bleeding controlled. Pt not UTD on Tetanus. PMS intact.   Prediabetic. No neuropathy hx.

## 2024-03-17 NOTE — ED PROVIDER NOTES
[facility]  EMERGENCY DEPARTMENT ENCOUNTER      Pt Name: Tommie Lux  MRN: [id number]  Birthdate 1975  Date of evaluation: 3/17/2024  Provider: Jennifer Cabrera PA-C    CHIEF COMPLAINT       Chief Complaint   Patient presents with    Foot Pain         HISTORY OF PRESENT ILLNESS   (Location/Symptom, Timing/Onset, Context/Setting, Quality, Duration, Modifying Factors, Severity)  Note limiting factors.   The patient is a-year-old female who was recently diagnosed with rheumatoid arthritis and started on prednisone.  She was gardening in her home and stepped back, she was wearing some thin soled shoes and stepped on a rake, the rake went through her shoe and caused puncture wounds in the bottom of her right foot.  The patient is having some discomfort currently, she also states she is able to bear weight, but must offload due to the pain when she is fully weightbearing.    The injury occurred approximately 1 hour prior to arrival, her  irrigated the area with normal saline, hydrogen peroxide, prior to bringing her to the emergency department.  Her tetanus immunization is not current.    The history is provided by the patient.         Review of External Medical Records:     Nursing Notes were reviewed.    REVIEW OF SYSTEMS    (2-9 systems for level 4, 10 or more for level 5)     Review of Systems   Skin:  Positive for wound (There are 2 puncture wounds noted to the plantar aspect of the right foot).       Except as noted above the remainder of the review of systems was reviewed and negative.       PAST MEDICAL HISTORY     Past Medical History:   Diagnosis Date    Low blood pressure     UTI (urinary tract infection)          SURGICAL HISTORY       Past Surgical History:   Procedure Laterality Date    APPENDECTOMY      OTHER SURGICAL HISTORY      ovarian cystectomy    OTHER SURGICAL HISTORY      appendectomy         CURRENT MEDICATIONS       Previous Medications    No medications on file

## 2024-03-18 NOTE — ED NOTES
8:08 PM  Change of shift.  Care of patient signed over to Dr. France.  Bedside handoff complete. Awaiting xray of the right foot.       Jennifer Cabrera, PATorinC  03/17/24 2008

## 2024-03-18 NOTE — DISCHARGE INSTRUCTIONS
Keep the foot clean and dry, ambulate, but refrain from ambulating long distances.     Monitor for fever, redness, increased pain in the foot, drainage and return to the ED if occur.

## 2024-03-18 NOTE — ED NOTES
[facility] Emergency Room Nursing Note        Patient Name: Tommie Lux      : 1975             MRN: [id number]      Chief Complaint: Foot Pain      Admit Diagnosis: No admission diagnoses are documented for this encounter.      Surgery: * No surgery found *            MD/RN reviewed discharge instructions and options with patient. Patient verbalized understanding. RN reviewed discharge instructions using teach back method. Patient ambulatory to exit without difficulty and no acute signs of distress. No complaints or needs expressed at this time. Patient counseled on medications prescribed at discharge (If prescribed). Vital signs stable. Patient to follow up with PCP/Specialist on the next business day for appointment. All questions answered by ER RN.          Lines:        Vitals: Patient Vitals for the past 12 hrs:   Temp Pulse Resp BP SpO2   24 98.1 °F (36.7 °C) 68 18 115/65 98 %   24 -- -- -- 110/62 100 %   24 193 98.2 °F (36.8 °C) 72 18 135/67 99 %         Signed by: Sina Tesfaye RN, HUGO, BSN, CMSRN                                              3/17/2024 at 8:42 PM

## 2024-03-18 NOTE — ED NOTES
[facility] Emergency Room Nursing Note        Patient Name: Tommie Lux      : 1975             MRN: [id number]      Chief Complaint:  Foot Pain      Admit Diagnosis: No admission diagnoses are documented for this encounter.      Admitting Provider: No admitting provider for patient encounter.      Surgery: * No surgery found *           Patient's Right Foot was Irrigated with NS and patient given a Tetanus shot on her Left Deltoid.         Lines:        Signed by: Sina Tesfaye RN, HUGO, BSN, CMSRN                                              3/17/2024 at 8:22 PM